# Patient Record
Sex: MALE | Race: BLACK OR AFRICAN AMERICAN | NOT HISPANIC OR LATINO | Employment: FULL TIME | URBAN - METROPOLITAN AREA
[De-identification: names, ages, dates, MRNs, and addresses within clinical notes are randomized per-mention and may not be internally consistent; named-entity substitution may affect disease eponyms.]

---

## 2022-02-21 ENCOUNTER — LAB REQUISITION (OUTPATIENT)
Dept: LAB | Facility: CLINIC | Age: 22
End: 2022-02-21
Payer: COMMERCIAL

## 2022-02-21 DIAGNOSIS — H18.621 KERATOCONUS, UNSTABLE, RIGHT EYE: ICD-10-CM

## 2022-02-21 PROCEDURE — 88304 TISSUE EXAM BY PATHOLOGIST: CPT | Mod: TC,ORL | Performed by: OPHTHALMOLOGY

## 2022-02-21 PROCEDURE — 88304 TISSUE EXAM BY PATHOLOGIST: CPT | Mod: 26 | Performed by: OPHTHALMOLOGY

## 2022-02-21 PROCEDURE — 88313 SPECIAL STAINS GROUP 2: CPT | Mod: 26 | Performed by: OPHTHALMOLOGY

## 2022-02-21 PROCEDURE — 88313 SPECIAL STAINS GROUP 2: CPT | Mod: TC,ORL | Performed by: OPHTHALMOLOGY

## 2022-02-22 LAB
PATH REPORT.COMMENTS IMP SPEC: NORMAL
PATH REPORT.COMMENTS IMP SPEC: NORMAL
PATH REPORT.FINAL DX SPEC: NORMAL
PATH REPORT.GROSS SPEC: NORMAL
PATH REPORT.MICROSCOPIC SPEC OTHER STN: NORMAL
PATH REPORT.RELEVANT HX SPEC: NORMAL
PHOTO IMAGE: NORMAL

## 2022-11-14 ENCOUNTER — HOSPITAL ENCOUNTER (EMERGENCY)
Facility: CLINIC | Age: 22
Discharge: HOME | End: 2022-11-14
Attending: EMERGENCY MEDICINE | Admitting: EMERGENCY MEDICINE
Payer: COMMERCIAL

## 2022-11-14 VITALS
SYSTOLIC BLOOD PRESSURE: 138 MMHG | OXYGEN SATURATION: 96 % | DIASTOLIC BLOOD PRESSURE: 96 MMHG | HEART RATE: 109 BPM | TEMPERATURE: 97.6 F | RESPIRATION RATE: 14 BRPM

## 2022-11-14 DIAGNOSIS — T40.711A ACCIDENTAL CANNABIS OVERDOSE, INITIAL ENCOUNTER: ICD-10-CM

## 2022-11-14 PROCEDURE — 93005 ELECTROCARDIOGRAM TRACING: CPT

## 2022-11-14 PROCEDURE — 99283 EMERGENCY DEPT VISIT LOW MDM: CPT

## 2022-11-14 ASSESSMENT — ACTIVITIES OF DAILY LIVING (ADL)
ADLS_ACUITY_SCORE: 35
ADLS_ACUITY_SCORE: 35

## 2022-11-14 NOTE — ED TRIAGE NOTES
Pt presents via EMS for concerns of an unintentional ingestion of delta8 and cbd. Pt states he was taking a break and consumed a brown his grandmother had made him. Pt states he was unaware of what was in the brown. States increasing anxiety. EMS reports heart rates as high at 170. Poison control advised cardiac monitoring for reports of tachycardia. Pt arrives hyperventilating. Cooperative but anxious. ABC intact, A&Ox4.     Triage Assessment     Row Name 11/14/22 1233       Triage Assessment (Adult)    Airway WDL WDL       Respiratory WDL    Respiratory WDL WDL       Skin Circulation/Temperature WDL    Skin Circulation/Temperature WDL WDL       Cardiac WDL    Cardiac WDL rhythm    Cardiac Rhythm ST       Peripheral/Neurovascular WDL    Peripheral Neurovascular WDL WDL       Cognitive/Neuro/Behavioral WDL    Cognitive/Neuro/Behavioral WDL mood/behavior    Mood/Behavior anxious

## 2022-11-14 NOTE — ED PROVIDER NOTES
History   Chief Complaint:  Palpitations       The history is limited by the condition of the patient (Intoxication).      Grace Sanchez is a 21 year old male who presents with palpitations. Approximately 1 hour ago, he accidentally consumed some brownies that contained CBD in them. Afterwards, he felt like his skin was crawling and noticed some palpitations. He called EMS and was brought to the ED. At bedside, he denies doing this on purpose. Also, he denies drug use or alcohol use.     Review of Systems   Unable to perform ROS: Mental status change (Intoxication)     Allergies:  No Known Allergies    Medications:  The patient denies current use of medications.     Past Medical History:     The patient denies pertinent past medical history.    Past Surgical History:    The patient denies pertinent past surgical history.     Family History:    The patient denies pertinent family history.      Social History:  The patient presents to the ED alone via private vehicle   PCP: No primary care provider on file.   Denies hx of alcohol use or drug use.     Physical Exam     Patient Vitals for the past 24 hrs:   BP Temp Pulse Resp SpO2   11/14/22 1555 -- -- 104 26 97 %   11/14/22 1545 -- -- 100 16 98 %   11/14/22 1535 (!) 146/82 -- 97 16 96 %   11/14/22 1430 (!) 151/79 -- (!) 122 25 100 %   11/14/22 1400 (!) 148/74 -- (!) 121 19 99 %   11/14/22 1345 (!) 148/81 -- (!) 126 13 92 %   11/14/22 1315 (!) 171/90 -- (!) 156 (!) 33 99 %   11/14/22 1233 (!) 167/95 97.6  F (36.4  C) (!) 165 24 98 %       Physical Exam  Constitutional: Patient is well appearing. Anxious and breathing fast.   Head: Atraumatic.  Eyes: Conjunctivae and EOM are normal. No scleral icterus.  Pupils normal.  Neck: Normal range of motion. Neck supple.   Cardiovascular: Tachycardia, regular rhythm, normal heart sounds and intact distal perfusion.   Pulmonary/Chest: Breath sounds normal. No respiratory distress.  Abdominal: Soft. Bowel sounds are normal. No  distension. No tenderness. No rebound or guarding.   Musculoskeletal: Normal range of motion. No edema or tenderness.   Neurological: Alert and orientated to person, place, and time. No observable focal neuro deficit  Skin: Warm and dry. No rash noted. Not diaphoretic.     Emergency Department Course   ECG:  ECG taken at 1304, ECG read at 1304  Sinus tachycardia   Possible Left atrial enlargement   Nonspecific T wave abnormality   Abnormal ECG   Rate 148 bpm. MT interval 148 ms. QRS duration 78 ms. QT/QTc 244/383 ms. P-R-T axes 65 36 9.     Emergency Department Course:    Reviewed:  I reviewed nursing notes, vitals and past medical history    Assessments:  1235 I obtained history and examined the patient as noted above.    I rechecked the patient and explained findings. I discussed plan for discharge home.    Disposition:  Care of the patient was transferred to my colleague Dr. Moore pending clinical sobriety.     Impression & Plan   Medical Decision Making:  Grace Sanchez is a 21 year old male who presents for evaluation of cannabis ingestion. This is consistent with drug overdose.  This is an unintentional overdose.   A broad workup was considered but patient can clearly show that grandma made THC or Delta8 containing brownies and he felt ill effects after accidentally eating these and after prolonged observation he is now sx free and asking to go home.        Diagnosis:    ICD-10-CM    1. Accidental cannabis overdose, initial encounter  T40.711A           Discharge Medications:  New Prescriptions    No medications on file       Scribe Disclosure:  I, Ryan Meet, am serving as a scribe at 12:37 PM on 11/14/2022 to document services personally performed by Ashok Nagel MD based on my observations and the provider's statements to me.        Ashok Nagel MD  11/14/22 9816

## 2022-11-15 LAB
ATRIAL RATE - MUSE: 148 BPM
DIASTOLIC BLOOD PRESSURE - MUSE: NORMAL MMHG
INTERPRETATION ECG - MUSE: NORMAL
P AXIS - MUSE: 65 DEGREES
PR INTERVAL - MUSE: 148 MS
QRS DURATION - MUSE: 78 MS
QT - MUSE: 244 MS
QTC - MUSE: 383 MS
R AXIS - MUSE: 36 DEGREES
SYSTOLIC BLOOD PRESSURE - MUSE: NORMAL MMHG
T AXIS - MUSE: 9 DEGREES
VENTRICULAR RATE- MUSE: 148 BPM

## 2023-02-12 ENCOUNTER — HEALTH MAINTENANCE LETTER (OUTPATIENT)
Age: 23
End: 2023-02-12

## 2023-03-03 ENCOUNTER — OFFICE VISIT (OUTPATIENT)
Dept: INTERNAL MEDICINE | Facility: CLINIC | Age: 23
End: 2023-03-03
Payer: COMMERCIAL

## 2023-03-03 VITALS
RESPIRATION RATE: 16 BRPM | BODY MASS INDEX: 36.12 KG/M2 | DIASTOLIC BLOOD PRESSURE: 75 MMHG | OXYGEN SATURATION: 97 % | WEIGHT: 296.6 LBS | HEIGHT: 76 IN | TEMPERATURE: 98.1 F | HEART RATE: 98 BPM | SYSTOLIC BLOOD PRESSURE: 144 MMHG

## 2023-03-03 DIAGNOSIS — E55.9 VITAMIN D DEFICIENCY: ICD-10-CM

## 2023-03-03 DIAGNOSIS — R03.0 WHITE COAT SYNDROME WITHOUT DIAGNOSIS OF HYPERTENSION: ICD-10-CM

## 2023-03-03 DIAGNOSIS — F41.9 ANXIETY: ICD-10-CM

## 2023-03-03 DIAGNOSIS — Z11.4 SCREENING FOR HIV (HUMAN IMMUNODEFICIENCY VIRUS): ICD-10-CM

## 2023-03-03 DIAGNOSIS — Z00.00 ROUTINE GENERAL MEDICAL EXAMINATION AT A HEALTH CARE FACILITY: Primary | ICD-10-CM

## 2023-03-03 DIAGNOSIS — E66.01 MORBID OBESITY (H): ICD-10-CM

## 2023-03-03 DIAGNOSIS — E66.812 CLASS 2 OBESITY WITHOUT SERIOUS COMORBIDITY WITH BODY MASS INDEX (BMI) OF 36.0 TO 36.9 IN ADULT, UNSPECIFIED OBESITY TYPE: ICD-10-CM

## 2023-03-03 DIAGNOSIS — Z11.59 NEED FOR HEPATITIS C SCREENING TEST: ICD-10-CM

## 2023-03-03 DIAGNOSIS — E11.9 TYPE 2 DIABETES MELLITUS WITHOUT COMPLICATION, WITHOUT LONG-TERM CURRENT USE OF INSULIN (H): ICD-10-CM

## 2023-03-03 DIAGNOSIS — R00.2 PALPITATIONS: ICD-10-CM

## 2023-03-03 LAB
ALBUMIN SERPL BCG-MCNC: 4.3 G/DL (ref 3.5–5.2)
ALP SERPL-CCNC: 111 U/L (ref 40–129)
ALT SERPL W P-5'-P-CCNC: 19 U/L (ref 10–50)
ANION GAP SERPL CALCULATED.3IONS-SCNC: 12 MMOL/L (ref 7–15)
AST SERPL W P-5'-P-CCNC: 21 U/L (ref 10–50)
BILIRUB SERPL-MCNC: 0.2 MG/DL
BUN SERPL-MCNC: 15.8 MG/DL (ref 6–20)
CALCIUM SERPL-MCNC: 9.5 MG/DL (ref 8.6–10)
CHLORIDE SERPL-SCNC: 101 MMOL/L (ref 98–107)
CHOLEST SERPL-MCNC: 146 MG/DL
CREAT SERPL-MCNC: 1.03 MG/DL (ref 0.67–1.17)
DEPRECATED HCO3 PLAS-SCNC: 24 MMOL/L (ref 22–29)
ERYTHROCYTE [DISTWIDTH] IN BLOOD BY AUTOMATED COUNT: 11.4 % (ref 10–15)
GFR SERPL CREATININE-BSD FRML MDRD: >90 ML/MIN/1.73M2
GLUCOSE SERPL-MCNC: 370 MG/DL (ref 70–99)
HBA1C MFR BLD: 13 % (ref 0–5.6)
HCT VFR BLD AUTO: 44.8 % (ref 40–53)
HDLC SERPL-MCNC: 41 MG/DL
HGB BLD-MCNC: 15.3 G/DL (ref 13.3–17.7)
LDLC SERPL CALC-MCNC: 85 MG/DL
MCH RBC QN AUTO: 29.6 PG (ref 26.5–33)
MCHC RBC AUTO-ENTMCNC: 34.2 G/DL (ref 31.5–36.5)
MCV RBC AUTO: 87 FL (ref 78–100)
NONHDLC SERPL-MCNC: 105 MG/DL
PLATELET # BLD AUTO: 308 10E3/UL (ref 150–450)
POTASSIUM SERPL-SCNC: 4 MMOL/L (ref 3.4–5.3)
PROT SERPL-MCNC: 7.1 G/DL (ref 6.4–8.3)
RBC # BLD AUTO: 5.17 10E6/UL (ref 4.4–5.9)
SODIUM SERPL-SCNC: 137 MMOL/L (ref 136–145)
TRIGL SERPL-MCNC: 98 MG/DL
TSH SERPL DL<=0.005 MIU/L-ACNC: 1.4 UIU/ML (ref 0.3–4.2)
WBC # BLD AUTO: 10.1 10E3/UL (ref 4–11)

## 2023-03-03 PROCEDURE — 80061 LIPID PANEL: CPT

## 2023-03-03 PROCEDURE — 99385 PREV VISIT NEW AGE 18-39: CPT | Mod: 25

## 2023-03-03 PROCEDURE — 87389 HIV-1 AG W/HIV-1&-2 AB AG IA: CPT

## 2023-03-03 PROCEDURE — 82306 VITAMIN D 25 HYDROXY: CPT

## 2023-03-03 PROCEDURE — 85027 COMPLETE CBC AUTOMATED: CPT

## 2023-03-03 PROCEDURE — 36415 COLL VENOUS BLD VENIPUNCTURE: CPT

## 2023-03-03 PROCEDURE — 90715 TDAP VACCINE 7 YRS/> IM: CPT

## 2023-03-03 PROCEDURE — 80053 COMPREHEN METABOLIC PANEL: CPT

## 2023-03-03 PROCEDURE — 86803 HEPATITIS C AB TEST: CPT

## 2023-03-03 PROCEDURE — 84443 ASSAY THYROID STIM HORMONE: CPT

## 2023-03-03 PROCEDURE — 99214 OFFICE O/P EST MOD 30 MIN: CPT | Mod: 25

## 2023-03-03 PROCEDURE — 90471 IMMUNIZATION ADMIN: CPT

## 2023-03-03 PROCEDURE — 83036 HEMOGLOBIN GLYCOSYLATED A1C: CPT

## 2023-03-03 RX ORDER — HYDROXYZINE HYDROCHLORIDE 25 MG/1
25 TABLET, FILM COATED ORAL 3 TIMES DAILY PRN
Qty: 30 TABLET | Refills: 1 | Status: SHIPPED | OUTPATIENT
Start: 2023-03-03

## 2023-03-03 RX ORDER — GLUCOSAMINE HCL/CHONDROITIN SU 500-400 MG
CAPSULE ORAL
Qty: 100 EACH | Refills: 3 | Status: SHIPPED | OUTPATIENT
Start: 2023-03-03

## 2023-03-03 RX ORDER — PREDNISOLONE ACETATE 10 MG/ML
SUSPENSION/ DROPS OPHTHALMIC
COMMUNITY
Start: 2022-12-13

## 2023-03-03 RX ORDER — PROPRANOLOL HYDROCHLORIDE 10 MG/1
10 TABLET ORAL 3 TIMES DAILY PRN
Qty: 30 TABLET | Refills: 1 | Status: SHIPPED | OUTPATIENT
Start: 2023-03-03

## 2023-03-03 RX ORDER — LANCETS
EACH MISCELLANEOUS
Qty: 100 EACH | Refills: 6 | Status: SHIPPED | OUTPATIENT
Start: 2023-03-03

## 2023-03-03 ASSESSMENT — ANXIETY QUESTIONNAIRES
5. BEING SO RESTLESS THAT IT IS HARD TO SIT STILL: SEVERAL DAYS
1. FEELING NERVOUS, ANXIOUS, OR ON EDGE: SEVERAL DAYS
GAD7 TOTAL SCORE: 9
3. WORRYING TOO MUCH ABOUT DIFFERENT THINGS: SEVERAL DAYS
6. BECOMING EASILY ANNOYED OR IRRITABLE: SEVERAL DAYS
7. FEELING AFRAID AS IF SOMETHING AWFUL MIGHT HAPPEN: MORE THAN HALF THE DAYS
IF YOU CHECKED OFF ANY PROBLEMS ON THIS QUESTIONNAIRE, HOW DIFFICULT HAVE THESE PROBLEMS MADE IT FOR YOU TO DO YOUR WORK, TAKE CARE OF THINGS AT HOME, OR GET ALONG WITH OTHER PEOPLE: SOMEWHAT DIFFICULT
2. NOT BEING ABLE TO STOP OR CONTROL WORRYING: SEVERAL DAYS
GAD7 TOTAL SCORE: 9

## 2023-03-03 ASSESSMENT — ENCOUNTER SYMPTOMS
CONSTIPATION: 1
CHILLS: 1
SHORTNESS OF BREATH: 1
NERVOUS/ANXIOUS: 1
PALPITATIONS: 1

## 2023-03-03 ASSESSMENT — PATIENT HEALTH QUESTIONNAIRE - PHQ9
5. POOR APPETITE OR OVEREATING: MORE THAN HALF THE DAYS
SUM OF ALL RESPONSES TO PHQ QUESTIONS 1-9: 11

## 2023-03-03 ASSESSMENT — PAIN SCALES - GENERAL: PAINLEVEL: NO PAIN (0)

## 2023-03-03 NOTE — PATIENT INSTRUCTIONS
Propranolol for racing heart     Hydroxyzine can help for thoughts or general anxious feelings- makes drowsy    Exercise, journaling, deep breathing techniques. Reach out if you would like a referral to a therapist.     Low salt diet, losing weight, reduce caffeine     Noom and weight watchers     Watch BP outside of clinic if the 24 hour monitor does not work. I would like your BP to be under 140/90. Can schedule nurse visit in about a month as well.

## 2023-03-03 NOTE — PROGRESS NOTES
insulinSUBJECTIVE:   CC: Grace is an 22 year old who presents for preventative health visit.     Patient has been advised of split billing requirements and indicates understanding: Yes  Healthy Habits:     Getting at least 3 servings of Calcium per day:  Yes    Bi-annual eye exam:  Yes    Dental care twice a year:  NO    Sleep apnea or symptoms of sleep apnea:  None    Diet:  Breakfast skipped    Frequency of exercise:  1 day/week    Duration of exercise:  45-60 minutes    Taking medications regularly:  Yes    Medication side effects:  None    PHQ-2 Total Score: 0    Additional concerns today:  Yes      Today's PHQ-2 Score:   PHQ-2 ( 1999 [employer]) 3/3/2023   Q1: Little interest or pleasure in doing things 0   Q2: Feeling down, depressed or hopeless 0   PHQ-2 Score 0   Q1: Little interest or pleasure in doing things Several days   Q2: Feeling down, depressed or hopeless Several days   PHQ-2 Score 2       Social History     Tobacco Use     Smoking status: Never     Smokeless tobacco: Never   Substance Use Topics     Alcohol use: Not on file       Alcohol Use 3/3/2023   Prescreen: >3 drinks/day or >7 drinks/week? Not Applicable       Last PSA: No results found for: PSA    Reviewed orders with patient. Reviewed health maintenance and updated orders accordingly - Yes  Lab work is in process    Reviewed and updated as needed this visit by clinical staff   Tobacco  Allergies  Meds              Reviewed and updated as needed this visit by Provider                 History reviewed. No pertinent past medical history.   Past Surgical History:   Procedure Laterality Date     CORNEAL TRANSPLANT         Review of Systems   Constitutional: Positive for chills.   Respiratory: Positive for shortness of breath.    Cardiovascular: Positive for palpitations.   Gastrointestinal: Positive for constipation.   Genitourinary: Negative for impotence and penile discharge.   Psychiatric/Behavioral: Positive for mood changes. The patient is  "nervous/anxious.      Back in November ate synthetic marijuana and since them you have had racing heart and stomach cramping intermittently- it is less often than it used to be but is still happening once every 2-3 weeks. Symptoms last   Denies dizziness, SOB with these occurrences  Sometimes has feeling of fear or impending doom      OBJECTIVE:   BP (!) 154/84   Pulse 115   Temp 98.1  F (36.7  C) (Tympanic)   Resp 16   Ht 1.93 m (6' 4\")   Wt 134.5 kg (296 lb 9.6 oz)   SpO2 97%   BMI 36.10 kg/m      Physical Exam  GENERAL: alert and no distress  EYES: Eyes grossly normal to inspection, PERRL and conjunctivae and sclerae normal  HENT: ear canals and TM's normal, nose and mouth without ulcers or lesions  NECK: no adenopathy, no asymmetry, masses, or scars and thyroid normal to palpation  RESP: lungs clear to auscultation - no rales, rhonchi or wheezes  CV: regular rate and rhythm, normal S1 S2, no S3 or S4, no murmur, click or rub, no peripheral edema and peripheral pulses strong  ABDOMEN: soft, nontender, no hepatosplenomegaly, no masses and bowel sounds normal  MS: no gross musculoskeletal defects noted, no edema  SKIN: no suspicious lesions or rashes  NEURO: Normal strength and tone, mentation intact and speech normal  PSYCH: mentation appears normal, affect normal/bright    Diagnostic Test Results:  Labs reviewed in Epic    ASSESSMENT/PLAN:   (Z00.00) Routine general medical examination at a health care facility  (primary encounter diagnosis)  Comment: Routine physical  Plan: Hemoglobin A1c, Comprehensive metabolic panel         (BMP + Alb, Alk Phos, ALT, AST, Total. Bili,         TP), TSH with free T4 reflex, CBC with         platelets, Lipid panel reflex to direct LDL         Fasting            (Z11.4) Screening for HIV (human immunodeficiency virus)  Comment: Screening  Plan: HIV Antigen Antibody Combo            (Z11.59) Need for hepatitis C screening test  Comment: Screening  Plan: Hepatitis C Screen " Reflex to HCV RNA Quant and         Genotype            (R00.2) Palpitations  Comment: Patient has intermittent palpitations that resolve spontaneously.  There are no associated shortness of breath or dizziness.  Will check Holter to make sure this is not significant arrhythmia.  Plan: propranolol (INDERAL) 10 MG tablet, TSH with         free T4 reflex, Adult Leadless EKG Monitor 8 to        14 Days            (E66.9,  Z68.36) Class 2 obesity without serious comorbidity with body mass index (BMI) of 36.0 to 36.9 in adult, unspecified obesity type  Comment: Noted, patient does have diabetes    (F41.9) Anxiety  Comment: Patient describes what sounds like anxiety attacks since consuming synthetic marijuana brownie accidentally.  I will prescribe propranolol for physical symptoms of palpitations, and hydroxyzine for general anxious feelings/racing thoughts.  Both of these medications are to be used as needed  Plan: propranolol (INDERAL) 10 MG tablet, hydrOXYzine        (ATARAX) 25 MG tablet, Vitamin D Deficiency            (R03.0) White coat syndrome without diagnosis of hypertension  Comment: Patient BP slightly elevated today in clinic.  We will asked him to return in about 1 month for nurse visit  Plan: 24 Hour Blood Pressure Monitor - Adult            (E11.9) Type 2 diabetes mellitus without complication, without long-term current use of insulin (H)  Comment: Patient A1c returned resulted at 13.  He has no previous lab records which would indicate diabetes meaning that this is his initial diagnosis.  We will check islet antibodies to assess type I versus type 2 diabetes.    Plan to start long-acting insulin at 12 units.  Patient should check blood sugar daily in the morning before eating.  If greater than 150 for 3 days in a row he should increase medication by 2 units.    MTM and diabetic education referral placed    Plan: Islet cell antibody IgG, Islet Antigen (IA-2)         Autoantibody, Serum, blood glucose  monitoring         (NO BRAND SPECIFIED) meter device kit, blood         glucose (NO BRAND SPECIFIED) test strip, blood         glucose calibration (NO BRAND SPECIFIED)         solution, thin (NO BRAND SPECIFIED) lancets,         alcohol swab prep pads, Med Therapy Management         Referral, AMB Adult Diabetes Educator Referral,        insulin glargine (LANTUS PEN) 100 UNIT/ML pen,         insulin pen needle (32G X 4 MM) 32G X 4 MM         miscellaneous            (E66.01) Morbid obesity (H)  Comment: with diabetes.  Discussed with patient techniques to lose weight to help improve blood pressure.  This will also help with his new diagnosis of diabetes    He reports that he has never smoked. He has never used smokeless tobacco.      Juan Diego Ernandez NP  [facility]

## 2023-03-06 ENCOUNTER — OFFICE VISIT (OUTPATIENT)
Dept: PHARMACY | Facility: CLINIC | Age: 23
End: 2023-03-06
Payer: COMMERCIAL

## 2023-03-06 DIAGNOSIS — E11.69 TYPE 2 DIABETES MELLITUS WITH OTHER SPECIFIED COMPLICATION, WITHOUT LONG-TERM CURRENT USE OF INSULIN (H): Primary | ICD-10-CM

## 2023-03-06 LAB
DEPRECATED CALCIDIOL+CALCIFEROL SERPL-MC: 11 UG/L (ref 20–75)
HCV AB SERPL QL IA: NONREACTIVE
HIV 1+2 AB+HIV1 P24 AG SERPL QL IA: NONREACTIVE

## 2023-03-06 PROCEDURE — 99207 PR NO CHARGE LOS: CPT | Performed by: PHARMACIST

## 2023-03-06 RX ORDER — BLOOD SUGAR DIAGNOSTIC
STRIP MISCELLANEOUS
Qty: 100 STRIP | Refills: 11 | Status: SHIPPED | OUTPATIENT
Start: 2023-03-06

## 2023-03-06 RX ORDER — LANCETS
EACH MISCELLANEOUS
Qty: 100 EACH | Refills: 11 | Status: SHIPPED | OUTPATIENT
Start: 2023-03-06

## 2023-03-06 NOTE — PROGRESS NOTES
Clinical Pharmacy Consult:                                                    Grace Sanchez is a 22 year old male coming in for a clinical pharmacist consult.  He was referred to me from Juan Diego Camargo.     Reason for Consult: new diabetes diagnosis- Insulin start, meter education    Discussion:  Reviewed Accu-Chek Guide meter - he was able to use during visit.  Fasting blood glucose as 317 today.  Reviewed Lantus Solostar - he was able to perform a demo administration today.  Reviewed dosing technique, storage and hypoglycemia signs/treatment.      Plan:  1. Start Lantus 12 units once daily per Juan Diego.  2. Check blood sugars once daily before eating, goal blood sugar  mg/dl.  Patient should check blood sugar daily in the morning before eating.  If greater than 150 for 3 days in a row he should increase medication by 2 units.  3. Diabetes education should be calling you to schedule a visit.  If you don't hear from them by Wednesday, call them at [phone].  4.  Testing supplies and insulin are at the [facility] in [place]  - [facility] 1st floor      Francine Danielle , Pharm D  [phone] (phone)  Medication Therapy Management Pharmacist

## 2023-03-08 ENCOUNTER — VIRTUAL VISIT (OUTPATIENT)
Dept: EDUCATION SERVICES | Facility: CLINIC | Age: 23
End: 2023-03-08
Payer: COMMERCIAL

## 2023-03-08 DIAGNOSIS — E11.9 TYPE 2 DIABETES MELLITUS WITHOUT COMPLICATION, WITHOUT LONG-TERM CURRENT USE OF INSULIN (H): ICD-10-CM

## 2023-03-08 PROCEDURE — G0108 DIAB MANAGE TRN  PER INDIV: HCPCS | Mod: 93 | Performed by: DIETITIAN, REGISTERED

## 2023-03-08 PROCEDURE — 99207 PR NO CHARGE LOS: CPT | Mod: 93 | Performed by: DIETITIAN, REGISTERED

## 2023-03-08 NOTE — PATIENT INSTRUCTIONS
1).  Increase lantus insulin to 16 units nightly and increase 2 units every 2-3 days until fasting (morning) blood sugar consistently under 150    2).  45-75 grams of carb per meal; 15-30 grams per snack    3).  Make lab appointment    4).  Test blood sugar fasting and before and 2 hours after start of meal at (one meal) per day

## 2023-03-08 NOTE — CONFIDENTIAL NOTE
Diabetes Self-Management Education & Support    Presents for: Initial Assessment for new diagnosis    Type of Service: Telephone Visit    Originating Location (Patient Location): Home  Distant Location (Provider Location): Home  Mode of Communication:  Telephone        How would patient like to obtain AVS? MyChart    Assessment Type:   ASSESSMENT:  Pt newly dx with DM.  R/o type 1 vs 2.  Started new dx education.  Titrate insulin.    Patient's most recent   Lab Results   Component Value Date    A1C 13.0 03/03/2023     is not meeting goal of <7.0    Diabetes knowledge and skills assessment:   Patient is knowledgeable in diabetes management concepts related to: Taking Medication    Continue education with the following diabetes management concepts: Healthy Eating, Being Active and Monitoring    Based on learning assessment above, most appropriate setting for further diabetes education would be: Individual setting.      PLAN    Increase insulin 0.05 units per kg Lantus  12 - > 16 units  Add Cpeptide and BG lab    Increase testing     Topics to cover at upcoming visits: Healthy Eating, Being Active, Monitoring and Taking Medication    Follow-up: April    See Care Plan for co-developed, patient-state behavior change goals.  AVS provided for patient today.    Education Materials Provided:  [employer] Healthy Living with Diabetes Book, Safe Disposal Options for Needles & Syringes, BG Log Sheet, Carbohydrate Counting and My Plate Planner      SUBJECTIVE/OBJECTIVE:  Presents for: Initial Assessment for new diagnosis  Accompanied by: Self  Diabetes education in the past 24mo: No  Focus of Visit: Monitoring, Healthy Eating, Taking Medication  Disease course: Improving  How confident are you filling out medical forms by yourself:: Not Assessed  Difficulty affording diabetes medication?: No  Difficulty affording diabetes testing supplies?: No  Other concerns:: None  Cultural Influences/Ethnic Background:  Not  or  "      Diabetes Symptoms & Complications:  Fatigue: No  Neuropathy: No  Polydipsia: No  Polyphagia: No  Polyuria: No  Visual change: No  Slow healing wounds: No  Weight trend: Decreasing (has lost about 95# in the last year with active jobs)  Complications assessed today?: No    Patient Problem List and Family Medical History reviewed for relevant medical history, current medical status, and diabetes risk factors.    Vitals:  There were no vitals taken for this visit.  Estimated body mass index is 36.1 kg/m  as calculated from the following:    Height as of 3/3/23: 1.93 m (6' 4\").    Weight as of 3/3/23: 134.5 kg (296 lb 9.6 oz).   Last 3 BP:   BP Readings from Last 3 Encounters:   03/03/23 (!) 144/75   11/14/22 (!) 138/96       History   Smoking Status     Never   Smokeless Tobacco     Never       Labs:  Lab Results   Component Value Date    A1C 13.0 03/03/2023     Lab Results   Component Value Date     03/03/2023     Lab Results   Component Value Date    LDL 85 03/03/2023     Direct Measure HDL   Date Value Ref Range Status   03/03/2023 41 >=40 mg/dL Final   ]  GFR Estimate   Date Value Ref Range Status   03/03/2023 >90 >60 mL/min/1.73m2 Final     Comment:     eGFR calculated using 2021 CKD-EPI equation.     No results found for: GFRESTBLACK  Lab Results   Component Value Date    CR 1.03 03/03/2023     No results found for: MICROALBUMIN    Healthy Eating:  Healthy Eating Assessed Today: Yes  Cultural/Caodaism diet restrictions?: No  Meals include: Lunch  Breakfast: Says breakfast hard to eat; oatmeal or avocado toast and eggs  Lunch: Before dx chips, juice, 1 sandwich; salmon and salad and raspberries  Dinner: grilled chicken and salad  Beverages: Juice, Water, Other (unsweetened almond milk)  Has patient met with a dietitian in the past?: No    Being Active:  Being Active Assessed Today: No  Exercise:: Currently not exercising (but moves around a lot at work)    Monitoring:  Monitoring Assessed " Today: Yes  Did patient bring glucose meter to appointment? : Yes  Times checking blood sugar at home (number): 1  Times checking blood sugar at home (per): Day  Blood glucose trend: Decreasing    This morning blood sugar 231; 233; 300    Taking Medications:  Diabetes Medication(s)     Insulin       insulin glargine (LANTUS PEN) 100 UNIT/ML pen    Begin taking 12 units at bedtime.  Take blood sugar in the morning before eating and if greater than 150 for 3 days in a row increase insulin by 2 units.  Repeat every until blood sugar is less than 150 in the morning before eating. Max dose 50 units          Taking Medication Assessed Today: Yes  Current Treatments: Diet, Insulin Injections  Dose schedule: At bedtime  Given by: Patient  Injection/Infusion sites: Abdomen  Problems taking diabetes medications regularly?: No    Problem Solving:  Problem Solving Assessed Today: Yes  Is the patient at risk for hypoglycemia?: Yes  Hypoglycemia Frequency: Never              Reducing Risks:  Reducing Risks Assessed Today: No    Healthy Coping:  Healthy Coping Assessed Today: Yes  Emotional response to diabetes: Ready to learn, Acceptance  Stage of change: ACTION (Actively working towards change)  Patient Activation Measure Survey Score:  No flowsheet data found.      Care Plan and Education Provided:  Care Plan: Diabetes   Updates made by Anjelica Beatty RD since 3/8/2023 12:00 AM      Problem: HbA1C Not In Goal       Goal: Establish Regular Follow-Ups with PCP       Task: Discuss with PCP the recommended timing for patient's next follow up visit(s)    Responsible User: Anjelica Beatty RD      Task: Discuss schedule for PCP visits with patient    Responsible User: Anjelica Beatty RD      Goal: Get HbA1C Level in Goal       Task: Educate patient on diabetes education self-management topics    Responsible User: Anjelica Beatty RD      Task: Educate patient on benefits of regular glucose monitoring    Responsible User:  Anjelica Beatty RD      Task: Refer patient to appropriate extended care team member, as needed (Medication Therapy Management, Behavioral Health, Physical Therapy, etc.)    Responsible User: Anjelica Beatty RD      Task: Discuss diabetes treatment plan with patient    Responsible User: Anjelica Beatty RD      Problem: Diabetes Self-Management Education Needed to Optimize Self-Care Behaviors       Goal: Understand diabetes pathophysiology and disease progression       Task: Provide education on diabetes pathophysiology and disease progression specfic to patient's diabetes type    Responsible User: Anjelica Beatty RD      Goal: Healthy Eating - follow a healthy eating pattern for diabetes       Task: Provide education on portion control and consistency in amount, composition and timing of food intake    Responsible User: Anjelica Beatty RD      Task: Provide education on managing carbohydrate intake (carbohydrate counting, plate planning method, etc.)    Responsible User: Anjelica Beatty RD      Task: Provide education on weight management    Responsible User: Anjelica Beatty RD      Task: Provide education on heart healthy eating    Responsible User: Anjelica Beatty RD      Task: Provide education on eating out    Responsible User: Anjelica Beatty RD      Task: Develop individualized healthy eating plan with patient    Responsible User: Anjelica Beatty RD      Goal: Being Active - get regular physical activity, working up to at least 150 minutes per week       Task: Provide education on relationship of activity to glucose and precautions to take if at risk for low glucose    Responsible User: Anjelica Beatty RD      Task: Discuss barriers to physical activity with patient    Responsible User: Anjelica Beatty RD      Task: Develop physical activity plan with patient    Responsible User: Anjelica Beatty RD      Task: Explore community resources including walking groups,  assistance programs, and home videos    Responsible User: Anjelica Beatty RD      Goal: Monitoring - monitor glucose and ketones as directed       Task: Provide education on blood glucose monitoring (purpose, proper technique, frequency, glucose targets, interpreting results, when to use glucose control solution, sharps disposal)    Responsible User: Anjelica Beatty RD      Task: Provide education on continuous glucose monitoring (sensor placement, use of darren or /reader, understanding glucose trends, alerts and alarms, differences between sensor glucose and blood glucose)    Responsible User: Anjelica Beatty RD      Task: Provide education on ketone monitoring (when to monitor, frequency, etc.)    Responsible User: Anjelica Beatty RD      Goal: Taking Medication - patient is consistently taking medications as directed       Task: Provide education on action of prescribed medication, including when to take and possible side effects    Responsible User: Anjelica Beatty RD      Task: Provide education on insulin and injectable diabetes medications, including administration, storage, site selection and rotation for injection sites    Responsible User: Anjelica Beatty RD      Task: Discuss barriers to medication adherence with patient and provide management technique ideas as appropriate    Responsible User: Anjelica Beatty RD      Task: Provide education on frequency and refill details of medications    Responsible User: Anjelica Beatty RD      Goal: Problem Solving - know how to prevent and manage short-term diabetes complications       Task: Provide education on high blood glucose - causes, signs/symptoms, prevention and treatment    Responsible User: Anjelica Beatty RD      Task: Provide education on low blood glucose - causes, signs/symptoms, prevention, treatment, carrying a carbohydrate source at all times, and medical identification    Responsible User: Anjelica Beatty RD       Task: Provide education on safe travel with diabetes    Responsible User: Anjelica Beatty RD      Task: Provide education on how to care for diabetes on sick days    Responsible User: Anjelica Beatty RD      Task: Provide education on when to call a health care provider    Responsible User: Anjelica Beatty RD      Goal: Reducing Risks - know how to prevent and treat long-term diabetes complications       Task: Provide education on major complications of diabetes, prevention, early diagnostic measures and treatment of complications    Responsible User: Anjelica Beatty RD      Task: Provide education on recommended care for dental, eye and foot health    Responsible User: Anjelica Beatty RD      Task: Provide education on Hemoglobin A1c - goals and relationship to blood glucose levels    Responsible User: Anjelica Beatty RD      Task: Provide education on recommendations for heart health - lipid levels and goals, blood pressure and goals, and aspirin therapy, if indicated    Responsible User: Anjelica Beatty RD      Task: Provide education on tobacco cessation    Responsible User: Anjelica Beatty RD      Goal: Healthy Coping - use available resources to cope with the challenges of managing diabetes       Task: Discuss recognizing feelings about having diabetes    Responsible User: Anjelica Beatty RD      Task: Provide education on the benefits of making appropriate lifestyle changes    Responsible User: Anjelica Beatty RD      Task: Provide education on benefits of utilizing support systems    Responsible User: Anjelica Beatty RD      Task: Discuss methods for coping with stress    Responsible User: Anjelica Beatty RD      Task: Provide education on when to seek professional counseling    Responsible User: Anjelica Beatty RD      Care Plan: Diabetes   Updates made by Anjelica Beatty RD since 3/8/2023 12:00 AM      Problem: HbA1C Not In Goal       Goal: Establish Regular  Follow-Ups with PCP       Task: Discuss with PCP the recommended timing for patient's next follow up visit(s)    Responsible User: Anjelica Beatty RD      Task: Discuss schedule for PCP visits with patient    Responsible User: Anjelica Beatty RD      Goal: Get HbA1C Level in Goal       Task: Educate patient on diabetes education self-management topics    Responsible User: Anjelica Beatty RD      Task: Educate patient on benefits of regular glucose monitoring    Responsible User: Anjelica Beatty RD      Task: Refer patient to appropriate extended care team member, as needed (Medication Therapy Management, Behavioral Health, Physical Therapy, etc.)    Responsible User: Anjelica Beatty RD      Task: Discuss diabetes treatment plan with patient    Responsible User: Anjelica Beatty RD      Problem: Diabetes Self-Management Education Needed to Optimize Self-Care Behaviors       Goal: Understand diabetes pathophysiology and disease progression       Task: Provide education on diabetes pathophysiology and disease progression specfic to patient's diabetes type    Responsible User: Anjelica Beatty RD      Goal: Healthy Eating - follow a healthy eating pattern for diabetes       Task: Provide education on portion control and consistency in amount, composition and timing of food intake Completed 3/8/2023   Responsible User: Anjelica Beatty RD      Task: Provide education on managing carbohydrate intake (carbohydrate counting, plate planning method, etc.) Completed 3/8/2023   Responsible User: Anjelica Beatty RD      Task: Provide education on weight management    Responsible User: Anjelica Beatty RD      Task: Provide education on heart healthy eating    Responsible User: Anjelica Beatty RD      Task: Provide education on eating out    Responsible User: Anjelica Beatty RD      Task: Develop individualized healthy eating plan with patient    Responsible User: Anjelica Beatty RD      Goal:  Being Active - get regular physical activity, working up to at least 150 minutes per week       Task: Provide education on relationship of activity to glucose and precautions to take if at risk for low glucose    Responsible User: Anjelica Beatty RD      Task: Discuss barriers to physical activity with patient    Responsible User: Anjelica Beatty RD      Task: Develop physical activity plan with patient    Responsible User: Anjelica Beatty RD      Task: Explore community resources including walking groups, assistance programs, and home videos    Responsible User: Anjelica Beatty RD      Goal: Monitoring - monitor glucose and ketones as directed    Start Date: 3/8/2023   This Visit's Progress: 30%   Note:    Check blood sugars 3 times per day     Task: Provide education on blood glucose monitoring (purpose, proper technique, frequency, glucose targets, interpreting results, when to use glucose control solution, sharps disposal) Completed 3/8/2023   Responsible User: Anjelica Beatty RD      Task: Provide education on continuous glucose monitoring (sensor placement, use of darren or /reader, understanding glucose trends, alerts and alarms, differences between sensor glucose and blood glucose)    Responsible User: Anjelica Beatty RD      Task: Provide education on ketone monitoring (when to monitor, frequency, etc.)    Responsible User: Anjelica Beatty RD      Goal: Taking Medication - patient is consistently taking medications as directed       Task: Provide education on action of prescribed medication, including when to take and possible side effects Completed 3/8/2023   Responsible User: Anjelica Beatty RD      Task: Provide education on insulin and injectable diabetes medications, including administration, storage, site selection and rotation for injection sites Completed 3/8/2023   Responsible User: Anjelica Beatty RD      Task: Discuss barriers to medication adherence with patient  and provide management technique ideas as appropriate    Responsible User: Anjelica Beatty RD      Task: Provide education on frequency and refill details of medications    Responsible User: Anjelica Beatty RD      Goal: Problem Solving - know how to prevent and manage short-term diabetes complications       Task: Provide education on high blood glucose - causes, signs/symptoms, prevention and treatment    Responsible User: Anjelica Beatty RD      Task: Provide education on low blood glucose - causes, signs/symptoms, prevention, treatment, carrying a carbohydrate source at all times, and medical identification    Responsible User: Anjelica Beatty RD      Task: Provide education on safe travel with diabetes    Responsible User: Anjelica Beatty RD      Task: Provide education on how to care for diabetes on sick days    Responsible User: Anjelica Beatty RD      Task: Provide education on when to call a health care provider    Responsible User: Anjelica Beatty RD      Goal: Reducing Risks - know how to prevent and treat long-term diabetes complications       Task: Provide education on major complications of diabetes, prevention, early diagnostic measures and treatment of complications    Responsible User: Anjelica Beatty RD      Task: Provide education on recommended care for dental, eye and foot health    Responsible User: Anjelica Beatty RD      Task: Provide education on Hemoglobin A1c - goals and relationship to blood glucose levels    Responsible User: Anjelica Beatty RD      Task: Provide education on recommendations for heart health - lipid levels and goals, blood pressure and goals, and aspirin therapy, if indicated    Responsible User: Anjelica Beatty RD      Task: Provide education on tobacco cessation    Responsible User: Anjelica Beatty RD      Goal: Healthy Coping - use available resources to cope with the challenges of managing diabetes       Task: Discuss recognizing  feelings about having diabetes    Responsible User: Anjelica Beatty RD      Task: Provide education on the benefits of making appropriate lifestyle changes    Responsible User: Anjelica Beatty RD      Task: Provide education on benefits of utilizing support systems    Responsible User: Anjelica Beatty RD      Task: Discuss methods for coping with stress    Responsible User: Anjelica Beatty RD      Task: Provide education on when to seek professional counseling    Responsible User: Anjelica Beatty RD              Time Spent: 33 minutes  Encounter Type: Individual    Any diabetes medication dose changes were made via the CDE Protocol per the patient's referring provider. A copy of this encounter was shared with the provider.      Anjelica Beatty RD [facility]  [phone]

## 2023-03-08 NOTE — LETTER
3/8/2023         RE: Grace Sanchez  [address]  [place] [address]        Dear Colleague,    Thank you for referring your patient, Grace Sanchez, to the [facility]. Please see a copy of my visit note below.    No notes on file

## 2023-03-12 ENCOUNTER — NURSE TRIAGE (OUTPATIENT)
Dept: NURSING | Facility: CLINIC | Age: 23
End: 2023-03-12
Payer: COMMERCIAL

## 2023-03-12 NOTE — TELEPHONE ENCOUNTER
Pt calling for Care Advice related to [place] Savings time and if he is to take his Insulin tonight at the same time or adjust for the lost one hour.  Care Advice given.  Kala King RN  [facility] Nurse Advisor    Reason for Disposition    Caller has medicine question only, adult not sick, AND triager answers question    Additional Information    Negative: Drug overdose and triager unable to answer question    Negative: Caller requesting a renewal or refill of a medicine patient is currently taking    Negative: Caller requesting information unrelated to medicine    Negative: Caller requesting information about COVID-19 Vaccine    Negative: Caller requesting information about Emergency Contraception    Negative: Caller requesting information about Combined Birth Control Pills    Negative: Caller requesting information about Progestin Birth Control Pills    Negative: Caller requesting information about Post-Op pain or medicines    Negative: Caller requesting a prescription antibiotic (such as Penicillin) for Strep throat and has a positive culture result    Negative: Caller requesting a prescription anti-viral med (such as Tamiflu) and has influenza (flu) symptoms    Negative: Immunization reaction suspected    Negative: Rash while taking a medicine or within 3 days of stopping it    Negative: [1] Asthma and [2] having symptoms of asthma (cough, wheezing, etc.)    Negative: [1] Symptom of illness (e.g., headache, abdominal pain, earache, vomiting) AND [2] more than mild    Negative: Breastfeeding questions about mother's medicines and diet    Negative: MORE THAN A DOUBLE DOSE of a prescription or over-the-counter (OTC) drug    Negative: [1] DOUBLE DOSE (an extra dose or lesser amount) of prescription drug AND [2] any symptoms (e.g., dizziness, nausea, pain, sleepiness)    Negative: [1] DOUBLE DOSE (an extra dose or lesser amount) of over-the-counter (OTC) drug AND [2] any symptoms (e.g., dizziness, nausea, pain,  sleepiness)    Negative: Took another person's prescription drug    Negative: [1] DOUBLE DOSE (an extra dose or lesser amount) of prescription drug AND [2] NO symptoms (Exception: a double dose of antibiotics)    Negative: Diabetes drug error or overdose (e.g., took wrong type of insulin or took extra dose)    Negative: [1] Prescription not at pharmacy AND [2] was prescribed by PCP recently (Exception: triager has access to EMR and prescription is recorded there. Go to Home Care and confirm for pharmacy.)    Negative: [1] Pharmacy calling with prescription question AND [2] triager unable to answer question    Negative: [1] Caller has URGENT medicine question about med that PCP or specialist prescribed AND [2] triager unable to answer question    Negative: Medicine patch causing local rash or itching    Negative: [1] Caller has medicine question about med NOT prescribed by PCP AND [2] triager unable to answer question (e.g., compatibility with other med, storage)    Negative: Prescription request for new medicine (not a refill)    Negative: [1] Caller has NON-URGENT medicine question about med that PCP prescribed AND [2] triager unable to answer question    Negative: Caller wants to use a complementary or alternative medicine    Negative: [1] Prescription prescribed recently is not at pharmacy AND [2] triager has access to patient's EMR AND [3] prescription is recorded in the EMR    Negative: [1] DOUBLE DOSE (an extra dose or lesser amount) of over-the-counter (OTC) drug AND [2] NO symptoms    Negative: [1] DOUBLE DOSE (an extra dose or lesser amount) of antibiotic drug AND [2] NO symptoms    Protocols used: MEDICATION QUESTION CALL-A-

## 2023-03-16 ENCOUNTER — LAB (OUTPATIENT)
Dept: LAB | Facility: CLINIC | Age: 23
End: 2023-03-16
Payer: COMMERCIAL

## 2023-03-16 DIAGNOSIS — E55.9 VITAMIN D DEFICIENCY: ICD-10-CM

## 2023-03-16 DIAGNOSIS — E11.9 TYPE 2 DIABETES MELLITUS WITHOUT COMPLICATION, WITHOUT LONG-TERM CURRENT USE OF INSULIN (H): ICD-10-CM

## 2023-03-16 LAB — GLUCOSE BLD-MCNC: 170 MG/DL (ref 60–99)

## 2023-03-16 PROCEDURE — 82947 ASSAY GLUCOSE BLOOD QUANT: CPT

## 2023-03-16 PROCEDURE — 84681 ASSAY OF C-PEPTIDE: CPT

## 2023-03-16 PROCEDURE — 36415 COLL VENOUS BLD VENIPUNCTURE: CPT

## 2023-03-16 PROCEDURE — 99000 SPECIMEN HANDLING OFFICE-LAB: CPT

## 2023-03-16 PROCEDURE — 86341 ISLET CELL ANTIBODY: CPT | Mod: 90

## 2023-03-17 LAB — C PEPTIDE SERPL-MCNC: 2.4 NG/ML (ref 0.9–6.9)

## 2023-03-18 LAB — PANC ISLET CELL AB TITR SER: NORMAL {TITER}

## 2023-03-21 ENCOUNTER — HOSPITAL ENCOUNTER (OUTPATIENT)
Dept: CARDIOLOGY | Facility: CLINIC | Age: 23
Discharge: HOME | End: 2023-03-21
Payer: COMMERCIAL

## 2023-03-21 DIAGNOSIS — R00.2 PALPITATIONS: ICD-10-CM

## 2023-03-21 LAB — ISLET CELL512 AB SER IA-ACNC: <5.4 U/ML

## 2023-03-21 PROCEDURE — 93248 EXT ECG>7D<15D REV&INTERPJ: CPT | Performed by: INTERNAL MEDICINE

## 2023-03-21 PROCEDURE — 93246 EXT ECG>7D<15D RECORDING: CPT

## 2023-03-22 ENCOUNTER — MYC MEDICAL ADVICE (OUTPATIENT)
Dept: INTERNAL MEDICINE | Facility: CLINIC | Age: 23
End: 2023-03-22
Payer: COMMERCIAL

## 2023-03-22 NOTE — TELEPHONE ENCOUNTER
Met with pt and dtr at bedside to discuss hospice services in the home setting. Explained how services work and how the discharge process will occur.  DME and meds will be ordered tonight for delivery tomorrow after 11am per daughter request. Will order Med Please see [employer] message and advise.

## 2023-03-24 NOTE — TELEPHONE ENCOUNTER
Take what exactly? The only medication that could be a concern is propranolol can mask signs of hypoglycemia like tachycardia.     Is there another medicaiton patient is wondering about?

## 2023-03-27 NOTE — TELEPHONE ENCOUNTER
[employer] message sent to patient/caregiver with provider recommendations.     Lara Membreno RN  [facility]

## 2023-03-29 ENCOUNTER — MYC REFILL (OUTPATIENT)
Dept: INTERNAL MEDICINE | Facility: CLINIC | Age: 23
End: 2023-03-29
Payer: COMMERCIAL

## 2023-03-29 DIAGNOSIS — E55.9 VITAMIN D DEFICIENCY: ICD-10-CM

## 2023-03-30 NOTE — TELEPHONE ENCOUNTER
Pending Prescriptions:                       Disp   Refills    vitamin D3 (CHOLECALCIFEROL) 1.25 MG (500*8 caps*0            Sig: Take 1 capsule (50,000 Units) by mouth every 7           days    Patient sent a [employer] message asking for refill - there is a patient comment that says he needs 4 more.    [employer] message sent to patient asking why he needs 4 more tabs (prescription sent 3/8/23 #8 tabs - take one tab every 7 days - prescription should be good until May 2023).

## 2023-03-30 NOTE — TELEPHONE ENCOUNTER
Called pharmacy and they stated that the insurance only allows him to get 4 at a time.  They split the prescription in half and has a refill.  They will get refill ready for the patient.      [employer] message sent to patient.

## 2023-04-05 ENCOUNTER — TELEPHONE (OUTPATIENT)
Dept: EDUCATION SERVICES | Facility: CLINIC | Age: 23
End: 2023-04-05

## 2023-04-05 ENCOUNTER — VIRTUAL VISIT (OUTPATIENT)
Dept: EDUCATION SERVICES | Facility: CLINIC | Age: 23
End: 2023-04-05
Payer: COMMERCIAL

## 2023-04-05 DIAGNOSIS — E11.9 TYPE 2 DIABETES MELLITUS WITHOUT COMPLICATION, WITHOUT LONG-TERM CURRENT USE OF INSULIN (H): Primary | ICD-10-CM

## 2023-04-05 PROCEDURE — G0108 DIAB MANAGE TRN  PER INDIV: HCPCS | Mod: 93 | Performed by: DIETITIAN, REGISTERED

## 2023-04-05 NOTE — TELEPHONE ENCOUNTER
Taqueria Adamson,    Pt's blood sugars are looking great on 22 units of lantus daily.          Can we start to add on metformin and titrate insulin?  I would recommend the followin).  Start with 500 mg daily of metformin XR and decrease insulin 25%    2). I will have him mychart in 1-2 weeks after doing this and if tolerating metformin XR, suggesting going up to 2 per day and another 25% decrease in his insulin    3)  At our next telephone visit in 1 month, if doing well on 2, I will titrate up to 3 and decrease his insulin another 25%    4).  I will have him mychart after this and titrate up to 4 if still doing well and look to discontinue insulin at this point.        If you're in agreement with plan, please indicate.  Orders pended for metformin and first titration of insulin.    Thanks!    WILLIAMS Ramsay [facility]  [phone]

## 2023-04-05 NOTE — LETTER
4/5/2023         RE: Grace Sanchez  [address]  [place] [address]        Dear Colleague,    Thank you for referring your patient, Grace Sanchez, to the [facility]. Please see a copy of my visit note below.    No notes on file

## 2023-04-05 NOTE — CONFIDENTIAL NOTE
22 units of insulin    Date Breakfast  Lunch  Dinner  Bedtime    Before After Before After Before After    4/5 119         4/4 113     101    4/3 111     135    4/2 117         4/1 130     167    3/31 94     119    3/30 125     120      Diabetes Self-Management Education & Support    Presents for: Follow-up    Type of Service: Telephone Visit    Originating Location (Patient Location): Home  Distant Location (Provider Location): [facility]  Mode of Communication:  Telephone    How would patient like to obtain AVS? Jatinderhart    Assessment Type:   ASSESSMENT:  It has been determined pt has type 2 vs type 1 DM.  Blood sugars 100% in goal.  Will ask about adding metformin and starting to wean insulin.  Pt may need additional medication beyond metformin, but he is not interested in GLP-1 or SGLT2 right now.  Losing weight on his own with diet and exercise changes.    Patient's most recent   Lab Results   Component Value Date    A1C 13.0 03/03/2023     is not meeting goal of <7.0    Diabetes knowledge and skills assessment:   Patient is knowledgeable in diabetes management concepts related to: Monitoring    Continue education with the following diabetes management concepts: Healthy Eating, Being Active and Taking Medication    Based on learning assessment above, most appropriate setting for further diabetes education would be: Individual setting.      PLAN    My chart for blood sugar review and 1 month for additional insulin titration and DM education  Topics to cover at upcoming visits: Healthy Eating, Being Active, Monitoring and Taking Medication    Follow-up: blood sugar review/education    See Care Plan for co-developed, patient-state behavior change goals.  AVS provided for patient today.    Education Materials Provided:  No new materials provided today      SUBJECTIVE/OBJECTIVE:  Presents for: Follow-up  Accompanied by: Self  Diabetes education in the past 24mo: Yes  Focus of Visit:  "Monitoring, Healthy Eating, Taking Medication  Disease course: Improving  How confident are you filling out medical forms by yourself:: Not Assessed  Difficulty affording diabetes medication?: No  Difficulty affording diabetes testing supplies?: No  Other concerns:: None  Cultural Influences/Ethnic Background:  Not  or       Diabetes Symptoms & Complications:  Fatigue: No  Neuropathy: No  Polydipsia: No  Polyphagia: No  Polyuria: No  Visual change: No  Slow healing wounds: No  Weight trend: Decreasing (has lost about 95# in the last year with active jobs)  Complications assessed today?: No    Patient Problem List and Family Medical History reviewed for relevant medical history, current medical status, and diabetes risk factors.    Vitals:  There were no vitals taken for this visit.  Estimated body mass index is 36.1 kg/m  as calculated from the following:    Height as of 3/3/23: 1.93 m (6' 4\").    Weight as of 3/3/23: 134.5 kg (296 lb 9.6 oz).   Last 3 BP:   BP Readings from Last 3 Encounters:   03/03/23 (!) 144/75   11/14/22 (!) 138/96       History   Smoking Status     Never   Smokeless Tobacco     Never       Labs:  Lab Results   Component Value Date    A1C 13.0 03/03/2023     Lab Results   Component Value Date     03/03/2023     Lab Results   Component Value Date    LDL 85 03/03/2023     Direct Measure HDL   Date Value Ref Range Status   03/03/2023 41 >=40 mg/dL Final   ]  GFR Estimate   Date Value Ref Range Status   03/03/2023 >90 >60 mL/min/1.73m2 Final     Comment:     eGFR calculated using 2021 CKD-EPI equation.     No results found for: GFRESTBLACK  Lab Results   Component Value Date    CR 1.03 03/03/2023     No results found for: MICROALBUMIN    Healthy Eating:  Healthy Eating Assessed Today: Yes  Cultural/Yazdanism diet restrictions?: No  Meals include: Lunch, Breakfast, Dinner  Breakfast: Says breakfast hard to eat; oatmeal or avocado toast and eggs  Lunch: Before dx chips, juice, 1 " sandwich; salmon and salad and raspberries  Dinner: grilled chicken and salad  Beverages: Juice, Water, Other (unsweetened almond milk)  Has patient met with a dietitian in the past?: Yes    Being Active:  Being Active Assessed Today: Yes  Exercise::  ( and )    Monitoring:  Monitoring Assessed Today: Yes  Did patient bring glucose meter to appointment? : Yes  Times checking blood sugar at home (number): 2  Times checking blood sugar at home (per): Day  Blood glucose trend: Decreasing    See top of note    Taking Medications:  Diabetes Medication(s)     Biguanides       metFORMIN (GLUCOPHAGE XR) 500 MG 24 hr tablet    Take 1 tablet (500 mg) by mouth daily (with dinner)    Insulin       insulin glargine (LANTUS PEN) 100 UNIT/ML pen    Take 16 units at bedtime.          Taking Medication Assessed Today: Yes  Current Treatments: Diet, Insulin Injections  Dose schedule: At bedtime  Given by: Patient  Injection/Infusion sites: Abdomen  Problems taking diabetes medications regularly?: No    Problem Solving:  Problem Solving Assessed Today: Yes  Is the patient at risk for hypoglycemia?: Yes  Hypoglycemia Frequency: Never              Reducing Risks:  Reducing Risks Assessed Today: No    Healthy Coping:  Healthy Coping Assessed Today: Yes  Emotional response to diabetes: Ready to learn, Acceptance  Stage of change: ACTION (Actively working towards change)  Patient Activation Measure Survey Score:       View : No data to display.                  Care Plan and Education Provided:  Care Plan: Diabetes   Updates made by Anjelica Beatty RD since 4/6/2023 12:00 AM      Problem: HbA1C Not In Goal       Goal: Establish Regular Follow-Ups with PCP       Task: Discuss with PCP the recommended timing for patient's next follow up visit(s) Completed 4/6/2023   Responsible User: Anjelica Beatty RD      Task: Discuss schedule for PCP visits with patient Completed 4/6/2023   Responsible User: Anjelica Beatty  WILLIAMS      Problem: Diabetes Self-Management Education Needed to Optimize Self-Care Behaviors       Goal: Healthy Eating - follow a healthy eating pattern for diabetes       Task: Provide education on managing carbohydrate intake (carbohydrate counting, plate planning method, etc.) Completed 4/6/2023   Responsible User: Anjelica Beatty RD      Goal: Monitoring - monitor glucose and ketones as directed       Task: Provide education on blood glucose monitoring (purpose, proper technique, frequency, glucose targets, interpreting results, when to use glucose control solution, sharps disposal) Completed 4/6/2023   Responsible User: Anjelica Beatty RD      Goal: Taking Medication - patient is consistently taking medications as directed    Start Date: 4/6/2023   This Visit's Progress: 0%   Note:    Pt will add metformin tablet daily and wean down insulin     Task: Provide education on action of prescribed medication, including when to take and possible side effects Completed 4/6/2023   Responsible User: Anjelica Beatty RD      Task: Provide education on insulin and injectable diabetes medications, including administration, storage, site selection and rotation for injection sites Completed 4/6/2023   Responsible User: Anjelica Beatty RD              Time Spent: 30 minutes  Encounter Type: Individual    Any diabetes medication dose changes were made via the CDE Protocol per the patient's referring provider. A copy of this encounter was shared with the provider.

## 2023-04-06 ENCOUNTER — HOSPITAL ENCOUNTER (OUTPATIENT)
Dept: CARDIOLOGY | Facility: CLINIC | Age: 23
Discharge: HOME | End: 2023-04-06
Payer: COMMERCIAL

## 2023-04-06 DIAGNOSIS — R03.0 WHITE COAT SYNDROME WITHOUT DIAGNOSIS OF HYPERTENSION: ICD-10-CM

## 2023-04-06 PROCEDURE — 93786 AMBL BP MNTR W/SW REC ONLY: CPT

## 2023-04-06 RX ORDER — METFORMIN HCL 500 MG
500 TABLET, EXTENDED RELEASE 24 HR ORAL
Qty: 60 TABLET | Refills: 1 | Status: SHIPPED | OUTPATIENT
Start: 2023-04-06 | End: 2023-04-14

## 2023-04-06 NOTE — TELEPHONE ENCOUNTER
Taqueria Adamson,    He will be meeting with me again in about 1 month, but I will recommend he also make follow up with you in June I that's okay?  Will be three months since A1C.    Thanks    Anjelica Beatty RD [facility]  [phone]

## 2023-04-06 NOTE — TELEPHONE ENCOUNTER
I am in agreement with this plan. Will patient need to schedule appointment with me, or will he be meeting you to make sure that this is appropriate at managing blood sugars?    Juan Diego Ernandez NP

## 2023-04-14 ENCOUNTER — MYC MEDICAL ADVICE (OUTPATIENT)
Dept: EDUCATION SERVICES | Facility: CLINIC | Age: 23
End: 2023-04-14
Payer: COMMERCIAL

## 2023-04-14 DIAGNOSIS — E11.9 TYPE 2 DIABETES MELLITUS WITHOUT COMPLICATION, WITHOUT LONG-TERM CURRENT USE OF INSULIN (H): ICD-10-CM

## 2023-04-14 RX ORDER — METFORMIN HCL 500 MG
500 TABLET, EXTENDED RELEASE 24 HR ORAL 2 TIMES DAILY WITH MEALS
Qty: 60 TABLET | Refills: 1 | Status: SHIPPED | OUTPATIENT
Start: 2023-04-14 | End: 2023-04-21

## 2023-04-14 NOTE — TELEPHONE ENCOUNTER
Diabetes Education Follow-up    Subjective/Objective:    Grace STRATTON Laura sent in blood glucose log for review. Last date of communication was: 4/5/23.    Diabetes is being managed with   Lifestyle (diet/activity), Diabetes Medications   Diabetes Medication(s)     Biguanides       metFORMIN (GLUCOPHAGE XR) 500 MG 24 hr tablet    Take 1 tablet (500 mg) by mouth 2 times daily (with meals)    Insulin       insulin glargine (LANTUS PEN) 100 UNIT/ML pen    Take 12 units at bedtime.          BG/Food Log:         Assessment:    Pt tolerating 1 metformin XR per day.     Plan/Response:   Will plan to increase to two tablets per day and decrease insulin 25%     Lantus 0-0-0-16 - > 0-0-0-12        Any diabetes medication dose changes were made via the CDE Protocol and Collaborative Practice Agreement with the patient's referring provider. A copy of this encounter was shared with the provider.    WILLIAMS Ramsay [facility]  [phone]

## 2023-04-21 ENCOUNTER — MYC MEDICAL ADVICE (OUTPATIENT)
Dept: EDUCATION SERVICES | Facility: CLINIC | Age: 23
End: 2023-04-21
Payer: COMMERCIAL

## 2023-04-21 DIAGNOSIS — E11.9 TYPE 2 DIABETES MELLITUS WITHOUT COMPLICATION, WITHOUT LONG-TERM CURRENT USE OF INSULIN (H): ICD-10-CM

## 2023-04-21 RX ORDER — METFORMIN HCL 500 MG
TABLET, EXTENDED RELEASE 24 HR ORAL
Qty: 90 TABLET | Refills: 1 | Status: SHIPPED | OUTPATIENT
Start: 2023-04-21 | End: 2023-05-05

## 2023-05-05 ENCOUNTER — VIRTUAL VISIT (OUTPATIENT)
Dept: EDUCATION SERVICES | Facility: OTHER | Age: 23
End: 2023-05-05
Payer: COMMERCIAL

## 2023-05-05 DIAGNOSIS — E11.9 TYPE 2 DIABETES MELLITUS WITHOUT COMPLICATION, WITHOUT LONG-TERM CURRENT USE OF INSULIN (H): ICD-10-CM

## 2023-05-05 PROCEDURE — G0108 DIAB MANAGE TRN  PER INDIV: HCPCS | Mod: AE | Performed by: DIETITIAN, REGISTERED

## 2023-05-05 RX ORDER — METFORMIN HCL 500 MG
1000 TABLET, EXTENDED RELEASE 24 HR ORAL 2 TIMES DAILY WITH MEALS
Qty: 360 TABLET | Refills: 1 | Status: SHIPPED | OUTPATIENT
Start: 2023-05-05 | End: 2023-07-31

## 2023-05-05 NOTE — CONFIDENTIAL NOTE
Diabetes Self-Management Education & Support    Presents for: Follow-up    Type of Service: Telephone Visit    Originating Location (Patient Location): Home  Distant Location (Provider Location): Offsite  Mode of Communication:  Telephone    Telephone Visit Start Time: 9 am   Telephone Visit End Time (telephone visit stop time): 9:30 am    How would patient like to obtain AVS? MyChart    Assessment Type:   ASSESSMENT:  Pt doing well on three metformin xr per day and 9 units of insulin.  Insulin dose currently calculated at 0.06 units per kg.    Patient's most recent   Lab Results   Component Value Date    A1C 13.0 03/03/2023     is not meeting goal of <7.0    Diabetes knowledge and skills assessment:   Patient is knowledgeable in diabetes management concepts related to: Healthy Eating and Monitoring    Continue education with the following diabetes management concepts: Healthy Eating, Monitoring and Taking Medication    Based on learning assessment above, most appropriate setting for further diabetes education would be: Individual setting.      PLAN    Discontinue insulin as approved by PCP  Increase metformin to 4 per day    Education complete  Pt to make follow up with PCP as A1C due in June.  Give us update next week on mychart to how blood sugars are doing off insulin  Topics to cover at upcoming visits: annual education    Pt may want CGM in future if has compatible phone  Pt declines GLP-1 at this time    Follow-up: as desired    See Care Plan for co-developed, patient-state behavior change goals.  AVS provided for patient today.    Education Materials Provided:  No new materials provided today      SUBJECTIVE/OBJECTIVE:  Presents for: Follow-up  Accompanied by: Self  Diabetes education in the past 24mo: Yes  Focus of Visit: Monitoring, Healthy Eating, Taking Medication, Diabetes Pathophysiology, Reducing Risks  Diabetes type: Type 2  Date of diagnosis: 2022  Disease course: Improving  How confident are you  "filling out medical forms by yourself:: Not Assessed  Difficulty affording diabetes medication?: No  Difficulty affording diabetes testing supplies?: No  Other concerns:: None  Cultural Influences/Ethnic Background:  Not  or       Diabetes Symptoms & Complications:  Fatigue: No  Neuropathy: No  Polydipsia: No  Polyphagia: No  Polyuria: No  Visual change: No  Slow healing wounds: No  Weight trend: Decreasing (has lost about 95# in the last year with active jobs)  Complications assessed today?: No    Patient Problem List and Family Medical History reviewed for relevant medical history, current medical status, and diabetes risk factors.    Vitals:  There were no vitals taken for this visit.  Estimated body mass index is 36.1 kg/m  as calculated from the following:    Height as of 3/3/23: 1.93 m (6' 4\").    Weight as of 3/3/23: 134.5 kg (296 lb 9.6 oz).   Last 3 BP:   BP Readings from Last 3 Encounters:   03/03/23 (!) 144/75   11/14/22 (!) 138/96       History   Smoking Status     Never   Smokeless Tobacco     Never       Labs:  Lab Results   Component Value Date    A1C 13.0 03/03/2023     Lab Results   Component Value Date     03/03/2023     Lab Results   Component Value Date    LDL 85 03/03/2023     Direct Measure HDL   Date Value Ref Range Status   03/03/2023 41 >=40 mg/dL Final   ]  GFR Estimate   Date Value Ref Range Status   03/03/2023 >90 >60 mL/min/1.73m2 Final     Comment:     eGFR calculated using 2021 CKD-EPI equation.     No results found for: GFRESTBLACK  Lab Results   Component Value Date    CR 1.03 03/03/2023     No results found for: MICROALBUMIN    Healthy Eating:  Healthy Eating Assessed Today: Yes  Cultural/Cheondoism diet restrictions?: No  Meals include: Lunch, Breakfast, Dinner  Breakfast: Says breakfast hard to eat; oatmeal or avocado toast and eggs  Lunch: Before dx chips, juice, 1 sandwich; salmon and salad and raspberries  Dinner: grilled chicken and salad  Snacks: walnuts " fruit sugar free popsicle  Beverages: Water, Other, Diet soda (unsweetened almond milk)  Has patient met with a dietitian in the past?: Yes    Being Active:  Being Active Assessed Today: Yes  Exercise:: Yes (going walking early in the morning)    Monitoring:  Monitoring Assessed Today: Yes  Did patient bring glucose meter to appointment? : Yes  Times checking blood sugar at home (number): 2  Times checking blood sugar at home (per): Day  Blood glucose trend: Decreasing (3% wt loss)    Says morning blood sugars 94-98; 110-140 (2 hours after evening meals).      Taking Medications:  Diabetes Medication(s)     Biguanides       metFORMIN (GLUCOPHAGE XR) 500 MG 24 hr tablet    Take 2 tablets (1,000 mg) by mouth 2 times daily (with meals)          Taking Medication Assessed Today: Yes  Current Treatments: Diet, Insulin Injections  Dose schedule: At bedtime  Given by: Patient  Injection/Infusion sites: Abdomen  Problems taking diabetes medications regularly?: No    Problem Solving:  Problem Solving Assessed Today: Yes  Is the patient at risk for hypoglycemia?: Yes  Hypoglycemia Frequency: Never              Reducing Risks:  Reducing Risks Assessed Today: Yes  CAD Risks: Diabetes Mellitus, Male sex  Has dilated eye exam at least once a year?: Yes  Sees dentist every 6 months?: Yes    Healthy Coping:  Healthy Coping Assessed Today: Yes  Emotional response to diabetes: Ready to learn, Acceptance  Stage of change: ACTION (Actively working towards change)  Patient Activation Measure Survey Score:       View : No data to display.                  Care Plan and Education Provided:  Care Plan: Diabetes   Updates made by Anjelica Beatty RD since 5/5/2023 12:00 AM      Problem: HbA1C Not In Goal       Goal: Get HbA1C Level in Goal       Task: Educate patient on diabetes education self-management topics Completed 5/5/2023   Responsible User: Anjelica Beatty RD      Task: Educate patient on benefits of regular glucose monitoring  Completed 5/5/2023   Responsible User: Anjelica Beatty RD      Problem: Diabetes Self-Management Education Needed to Optimize Self-Care Behaviors       Goal: Understand diabetes pathophysiology and disease progression       Task: Provide education on diabetes pathophysiology and disease progression specfic to patient's diabetes type Completed 5/5/2023   Responsible User: Anjelica Beatty RD      Goal: Healthy Eating - follow a healthy eating pattern for diabetes       Task: Provide education on portion control and consistency in amount, composition and timing of food intake Completed 5/5/2023   Responsible User: Anjelica Beatty RD      Task: Provide education on weight management Completed 5/5/2023   Responsible User: Anjelica Beatty RD      Task: Provide education on heart healthy eating Completed 5/5/2023   Responsible User: Anjelica Beatty RD      Goal: Being Active - get regular physical activity, working up to at least 150 minutes per week       Task: Provide education on relationship of activity to glucose and precautions to take if at risk for low glucose Completed 5/5/2023   Responsible User: Anjelica Beatty RD      Goal: Taking Medication - patient is consistently taking medications as directed    Start Date: 4/6/2023   This Visit's Progress: 100%   Recent Progress: 0%   Note:    Pt will add metformin tablet daily and wean down insulin     Goal: Problem Solving - know how to prevent and manage short-term diabetes complications       Task: Provide education on high blood glucose - causes, signs/symptoms, prevention and treatment Completed 5/5/2023   Responsible User: Anjelica Beatty RD      Goal: Reducing Risks - know how to prevent and treat long-term diabetes complications       Task: Provide education on major complications of diabetes, prevention, early diagnostic measures and treatment of complications Completed 5/5/2023   Responsible User: Anjelica Beatty RD      Task: Provide  education on recommended care for dental, eye and foot health Completed 5/5/2023   Responsible User: Anjelica Beatty RD      Task: Provide education on Hemoglobin A1c - goals and relationship to blood glucose levels Completed 5/5/2023   Responsible User: Anjelica Beatty RD      Task: Provide education on recommendations for heart health - lipid levels and goals, blood pressure and goals, and aspirin therapy, if indicated Completed 5/5/2023   Responsible User: Anjelica Beatty RD              Time Spent: 30 minutes  Encounter Type: Individual    Any diabetes medication dose changes were made via the CDE Protocol per the patient's referring provider. A copy of this encounter was shared with the provider.    Anjelica Beatty RD [facility]  [phone]

## 2023-05-05 NOTE — PATIENT INSTRUCTIONS
"Tomorrow, start to take 2 metformin tablets with breakfast and 2 metformin tablets with dinner    Stop you insulin when you start with 4 metformin per day.    Please send us a mychart with your blood sugars in another week -or sooner if you see blood sugars going up off insulin.    Please make Diabetes follow up appointment with your clinic for June    Let us know at any time if you want us to order either the dexcom G7 or Javier 3 for you.      I forgot to mention the Dexcom G7 does have a \"\" device you can see your blood sugars on without your phone - so you could go this route if you want.  This one you don't need to scan every 8 hours, but you do need to carry around a separate device other than your phone to see your blood sugars.    Just let us know if you want that option : )    Thanks!    Anjelica Beatty RD [facility]  [phone]   "

## 2023-05-05 NOTE — LETTER
5/5/2023         RE: Grace Sanchez  [address]  [place] [address]        Dear Colleague,    Thank you for referring your patient, Grace Sanchez, to the [facility]. Please see a copy of my visit note below.    No notes on file

## 2023-06-13 ENCOUNTER — LAB (OUTPATIENT)
Dept: LAB | Facility: CLINIC | Age: 23
End: 2023-06-13
Payer: COMMERCIAL

## 2023-06-13 DIAGNOSIS — E55.9 VITAMIN D DEFICIENCY: ICD-10-CM

## 2023-06-13 DIAGNOSIS — E11.9 DIABETES MELLITUS, TYPE 2 (H): ICD-10-CM

## 2023-06-13 LAB — HBA1C MFR BLD: 5.2 % (ref 0–5.6)

## 2023-06-13 PROCEDURE — 82306 VITAMIN D 25 HYDROXY: CPT

## 2023-06-13 PROCEDURE — 82043 UR ALBUMIN QUANTITATIVE: CPT

## 2023-06-13 PROCEDURE — 36415 COLL VENOUS BLD VENIPUNCTURE: CPT

## 2023-06-13 PROCEDURE — 82570 ASSAY OF URINE CREATININE: CPT

## 2023-06-13 PROCEDURE — 83036 HEMOGLOBIN GLYCOSYLATED A1C: CPT

## 2023-06-14 ENCOUNTER — MYC MEDICAL ADVICE (OUTPATIENT)
Dept: INTERNAL MEDICINE | Facility: CLINIC | Age: 23
End: 2023-06-14
Payer: COMMERCIAL

## 2023-06-14 LAB
CREAT UR-MCNC: 277 MG/DL
DEPRECATED CALCIDIOL+CALCIFEROL SERPL-MC: 35 UG/L (ref 20–75)
MICROALBUMIN UR-MCNC: <12 MG/L
MICROALBUMIN/CREAT UR: NORMAL MG/G{CREAT}

## 2023-07-31 ENCOUNTER — VIRTUAL VISIT (OUTPATIENT)
Dept: INTERNAL MEDICINE | Facility: CLINIC | Age: 23
End: 2023-07-31
Payer: COMMERCIAL

## 2023-07-31 DIAGNOSIS — E11.9 TYPE 2 DIABETES MELLITUS WITHOUT COMPLICATION, WITHOUT LONG-TERM CURRENT USE OF INSULIN (H): Primary | ICD-10-CM

## 2023-07-31 PROCEDURE — 99213 OFFICE O/P EST LOW 20 MIN: CPT | Mod: 95

## 2023-07-31 RX ORDER — METFORMIN HCL 500 MG
1000 TABLET, EXTENDED RELEASE 24 HR ORAL EVERY MORNING
Qty: 180 TABLET | Refills: 0 | Status: SHIPPED | OUTPATIENT
Start: 2023-07-31 | End: 2023-11-22

## 2023-07-31 NOTE — PROGRESS NOTES
"Grace is a 22 year old who is being evaluated via a billable video visit.      How would you like to obtain your AVS? MyChart  If the video visit is dropped, the invitation should be resent by: Text to cell phone: [phone]  Will anyone else be joining your video visit? No          Assessment & Plan       Type 2 diabetes mellitus without complication, without long-term current use of insulin (H)  Patient has had good glycemic control with an A1c of 5.2 at his last check. He is wondering if he needs to continue metformin 2000mg per day. We will try to reduce to 1000mg per day and see where his A1c is at mid September.     He was originally on lantus, but transitioned off after by May. His fasting blood sugars have been below 100 in the morning so I think this is a reasonable plan. If A1c is elevated in September we will increase metformin back to 2000 mg   - Basic metabolic panel  (Ca, Cl, CO2, Creat, Gluc, K, Na, BUN); Future  - Hemoglobin A1c; Future  - metFORMIN (GLUCOPHAGE XR) 500 MG 24 hr tablet; Take 2 tablets (1,000 mg) by mouth every morning     BMI:   Estimated body mass index is 36.1 kg/m  as calculated from the following:    Height as of 3/3/23: 1.93 m (6' 4\").    Weight as of 3/3/23: 134.5 kg (296 lb 9.6 oz).       Juan Diego Ernandez NP  [facility]    Jeanie Edwards is a 22 year old, presenting for the following health issues:  Diabetes (Follow up diabetes and hopefully to lower metformin dosage.)      7/31/2023     1:39 PM   Additional Questions   Roomed by Tammi Rayo MA   Accompanied by Himself       HPI     Diabetes Follow-up    How often are you checking your blood sugar? Two times daily  Blood sugar testing frequency justification:  Frequent hypoglycemia and Uncontrolled diabetes  What time of day are you checking your blood sugars (select all that apply)?  Before and after meals  Have you had any blood sugars above 200?  No  Have you had any blood sugars below 70?  " No  What symptoms do you notice when your blood sugar is low?  None  What concerns do you have today about your diabetes? None   Do you have any of these symptoms? (Select all that apply)  No numbness or tingling in feet.  No redness, sores or blisters on feet.  No complaints of excessive thirst.  No reports of blurry vision.  No significant changes to weight.  Have you had a diabetic eye exam in the last 12 months? Yes- Date of last eye exam: Couple of weeks ago        BP Readings from Last 2 Encounters:   03/03/23 (!) 144/75   11/14/22 (!) 138/96     Hemoglobin A1C (%)   Date Value   06/13/2023 5.2   03/03/2023 13.0 (H)     LDL Cholesterol Calculated (mg/dL)   Date Value   03/03/2023 85       How many servings of fruits and vegetables do you eat daily?  2-3  On average, how many sweetened beverages do you drink each day (Examples: soda, juice, sweet tea, etc.  Do NOT count diet or artificially sweetened beverages)?   0  How many days per week do you exercise enough to make your heart beat faster? 5  How many minutes a day do you exercise enough to make your heart beat faster? 60 or more  How many days per week do you miss taking your medication? 0      Fasting blood sugar is between 88 and 93 in the morning    Currently taking 2 metformin in the morning and two in the evening     Review of Systems   Constitutional, HEENT, cardiovascular, pulmonary, gi and gu systems are negative, except as otherwise noted.      Objective    Vitals - Patient Reported  Weight (Patient Reported): 116.1 kg (256 lb)        Physical Exam   GENERAL: alert and no distress  EYES: Eyes grossly normal to inspection.  No discharge or erythema, or obvious scleral/conjunctival abnormalities.  RESP: No audible wheeze, cough, or visible cyanosis.  No visible retractions or increased work of breathing.    SKIN: Visible skin clear. No significant rash, abnormal pigmentation or lesions.  NEURO: Cranial nerves grossly intact.  Mentation and speech  appropriate for age.  PSYCH: Mentation appears normal, affect normal/bright, judgement and insight intact, normal speech and appearance well-groomed.          Video-Visit Details    Type of service:  Video Visit   Video Start Time:  3:26  Video End Time:3:34 PM    Originating Location (pt. Location): Home  Distant Location (provider location):  On-site  Platform used for Video Visit: [place]

## 2023-08-28 ENCOUNTER — HOSPITAL ENCOUNTER (EMERGENCY)
Facility: CLINIC | Age: 23
Discharge: HOME | End: 2023-08-28
Attending: PHYSICIAN ASSISTANT | Admitting: PHYSICIAN ASSISTANT
Payer: OTHER MISCELLANEOUS

## 2023-08-28 VITALS
RESPIRATION RATE: 20 BRPM | WEIGHT: 262 LBS | OXYGEN SATURATION: 99 % | BODY MASS INDEX: 32.58 KG/M2 | TEMPERATURE: 97.3 F | HEART RATE: 127 BPM | DIASTOLIC BLOOD PRESSURE: 96 MMHG | HEIGHT: 75 IN | SYSTOLIC BLOOD PRESSURE: 148 MMHG

## 2023-08-28 DIAGNOSIS — S61.011A LACERATION OF RIGHT THUMB WITHOUT FOREIGN BODY WITHOUT DAMAGE TO NAIL, INITIAL ENCOUNTER: ICD-10-CM

## 2023-08-28 PROCEDURE — 250N000011 HC RX IP 250 OP 636: Performed by: PHYSICIAN ASSISTANT

## 2023-08-28 PROCEDURE — 99284 EMERGENCY DEPT VISIT MOD MDM: CPT

## 2023-08-28 PROCEDURE — 12001 RPR S/N/AX/GEN/TRNK 2.5CM/<: CPT

## 2023-08-28 RX ORDER — BUPIVACAINE HYDROCHLORIDE 5 MG/ML
10 INJECTION, SOLUTION EPIDURAL; INTRACAUDAL ONCE
Status: COMPLETED | OUTPATIENT
Start: 2023-08-28 | End: 2023-08-28

## 2023-08-28 RX ORDER — CEPHALEXIN 500 MG/1
500 CAPSULE ORAL 4 TIMES DAILY
Qty: 20 CAPSULE | Refills: 0 | Status: SHIPPED | OUTPATIENT
Start: 2023-08-28 | End: 2023-09-02

## 2023-08-28 RX ADMIN — BUPIVACAINE HYDROCHLORIDE 50 MG: 5 INJECTION, SOLUTION EPIDURAL; INTRACAUDAL; PERINEURAL at 12:22

## 2023-08-28 ASSESSMENT — ACTIVITIES OF DAILY LIVING (ADL): ADLS_ACUITY_SCORE: 33

## 2023-08-28 NOTE — ED PROVIDER NOTES
"  History   Chief Complaint:  Laceration    HPI   Grace Sanchez is a 22 year old male up to date on tetanus vaccination (2023) with history of type 2 diabetes mellitus presenting to the emergency department for evaluation of a laceration to the right first finger. Grace explains that he was cleaning the trash shoot at work when he accidentally cut his right thumb on a broken glass bottle. He denies any concerns for retained glass in the wound. Patient has good range of motion of thumb. No other injuries.     Independent Historian:   None - Patient Only    Medications:    Hydroxyzine   Metformin   Propranolol     Past Medical History:    Type 2 diabetes mellitus     Past Surgical History:    Corneal transplant     Physical Exam   Patient Vitals for the past 24 hrs:   BP Temp Temp src Pulse Resp SpO2 Height Weight   08/28/23 1003 (!) 148/96 97.3  F (36.3  C) Temporal (!) 127 20 99 % 1.905 m (6' 3\") 118.8 kg (262 lb)     Physical Exam  Constitutional: Alert, attentive, GCS 15  HENT:    Nose: Nose normal.    Mouth/Throat: Oropharynx is clear, mucous membranes are moist   Eyes: EOM are normal.   CV: regular rate and rhythm; no murmurs, rubs or gallups  Chest: Effort normal and breath sounds normal.   MSK: Normal range of motion of right thumb including flexion and extension of IP and MCP joint.   Neurological: Alert, attentive  Skin: Skin is warm and dry. Linear horizontal laceration to right thumb without active bleeding. No foreign body.    Emergency Department Course     Procedures     Laceration Repair    Procedure: Laceration Repair  Indication: Laceration  Consent: Verbal  Location:  R first (thumb) finger  Length: 2 cm  Preparation: Irrigation with Sterile Saline.  Anesthesia/Sedation: Bupivacaine - 0.5%   Treatment/Exploration: Wound explored, no foreign bodies found   Closure: The wound was closed with one layer. Skin/superficial layer was closed with 4 x 4-0 Nylon using Interrupted sutures.   Patient Status: " The patient tolerated the procedure well: Yes. There were no complications.    Emergency Department Course & Assessments:    Interventions:  Medications   BUPivacaine (MARCAINE) 0.5% preservative free injection (50 mg Intradermal $Given by Other Clinician 8/28/23 1222)     Assessments:  1051 I obtained history and examined the patient as noted above.    1147 I rechecked the patient. Laceration repair as noted above. I discussed findings and discharge with the patient. All questions answered.     Independent Interpretation (X-rays, CTs, rhythm strip):  None    Consultations/Discussion of Management or Tests:  None     Social Determinants of Health affecting care:   None    Disposition:  The patient was discharged to home.     Impression & Plan      Medical Decision Making:  Grace Sanchez is a 22 year old male up to date on tetanus vaccination (2023) with history of type 2 diabetes mellitus who presents with a laceration to right thumb. The wound was carefully evaluated and explored. The laceration was closed with sutures as noted above. There is no evidence of muscular, tendon, or bony damage with this laceration. No signs of foreign body. Xrays offered but declined by patient stating he is not concerned for possible retained glass. Possible complications (infection, scarring) were reviewed with the patient. Tetanus up to date. Follow up with primary care will be indicated for suture removal as noted in the discharge section. Antibiotic prescribed given history of DMII.      Diagnosis:    ICD-10-CM    1. Laceration of right thumb without foreign body without damage to nail, initial encounter  S61.011A         Discharge Medications:  Discharge Medication List as of 8/28/2023 12:21 PM        START taking these medications    Details   cephALEXin (KEFLEX) 500 MG capsule Take 1 capsule (500 mg) by mouth 4 times daily for 5 days, Disp-20 capsule, R-0, E-Prescribe            Scribe Disclosure:  Tressa PERSAUD am  serving as a scribe at 10:52 AM on 8/28/2023 to document services personally performed by Jocelyn Grover PA-C based on my observations and the provider's statements to me.     8/28/2023   Jocelyn Grover PA-C Steinbrueck, Emily, PA-C  08/28/23 7710

## 2023-08-28 NOTE — ED TRIAGE NOTES
Pt cut right thumb while at work today. Cut on glass. 2 cm laceration to web of thumb.      Triage Assessment       Row Name 08/28/23 1004       Triage Assessment (Adult)    Airway WDL WDL       Respiratory WDL    Respiratory WDL WDL       Skin Circulation/Temperature WDL    Skin Circulation/Temperature WDL --  laceration to right thumb       Cardiac WDL    Cardiac WDL WDL       Peripheral/Neurovascular WDL    Peripheral Neurovascular WDL WDL       Cognitive/Neuro/Behavioral WDL    Cognitive/Neuro/Behavioral WDL WDL

## 2023-08-28 NOTE — Clinical Note
Grace's mother accompanied Grace Sanchez to the emergency department on 8/28/2023. They may return to work on 08/29/2023.      If you have any questions or concerns, please don't hesitate to call.      Jocelyn Grover PA-C

## 2023-08-28 NOTE — LETTER
August 28, 2023      To Whom It May Concern:      Grace Sanchez was seen in our Emergency Department today, 08/28/23.  I expect his condition to improve over the next 1-3 days.  He may return to work/school when improved.    Sincerely,        Angie Mantilla RN

## 2023-08-28 NOTE — Clinical Note
Grace Sanchez was seen and treated in our emergency department on 8/28/2023.  He may return to work on 08/29/2023.       If you have any questions or concerns, please don't hesitate to call.      Jocelyn Grover PA-C

## 2023-09-07 ENCOUNTER — ALLIED HEALTH/NURSE VISIT (OUTPATIENT)
Dept: NURSING | Facility: CLINIC | Age: 23
End: 2023-09-07
Payer: COMMERCIAL

## 2023-09-07 DIAGNOSIS — Z48.02 ENCOUNTER FOR REMOVAL OF SUTURES: Primary | ICD-10-CM

## 2023-09-07 PROCEDURE — 99024 POSTOP FOLLOW-UP VISIT: CPT | Performed by: INTERNAL MEDICINE

## 2023-09-07 NOTE — PROGRESS NOTES
Cordellmarissa STRATTON Laura presents to the clinic today for removal of sutures.  The patient has had the sutures in place for 10 days.  There has been no history of infection or drainage.  4 sutures are seen located on the right thumb.  The wound is healing well with no signs of infection.  Tetanus status is up to date.   All sutures were easily removed today.  Routine wound care discussed.  The patient will follow up as needed.     Placed three steri strips on wound for extra reinforcement.    Thank you,  Yayo Rivera, Triage RN [place]  3:20 PM 9/7/2023

## 2023-09-14 ENCOUNTER — VIRTUAL VISIT (OUTPATIENT)
Dept: INTERNAL MEDICINE | Facility: CLINIC | Age: 23
End: 2023-09-14
Payer: COMMERCIAL

## 2023-09-14 DIAGNOSIS — E11.9 TYPE 2 DIABETES MELLITUS WITHOUT COMPLICATION, WITHOUT LONG-TERM CURRENT USE OF INSULIN (H): Primary | ICD-10-CM

## 2023-09-14 PROCEDURE — 99213 OFFICE O/P EST LOW 20 MIN: CPT | Mod: 95 | Performed by: INTERNAL MEDICINE

## 2023-09-14 NOTE — PROGRESS NOTES
"Grace is a 22 year old who is being evaluated via a billable video visit.      How would you like to obtain your AVS? MyChart  If the video visit is dropped, the invitation should be resent by: Text to cell phone: [phone]  Will anyone else be joining your video visit? No          Assessment & Plan     Type 2 diabetes mellitus without complication, without long-term current use of insulin (H)  Overall, patient average fasting blood glucose levels, patient has had good glycemic control.  He currently is on 500 mg of metformin and anticipating getting off the medication completely.  To complete A1c.  If A1c is elevated, patient may need to continue with the medication.can consider up titration if appropriate pending lab results.  - Hemoglobin A1c; Future             BMI:   Estimated body mass index is 32.75 kg/m  as calculated from the following:    Height as of 8/28/23: 1.905 m (6' 3\").    Weight as of 8/28/23: 118.8 kg (262 lb).           Bhavya Yoon MD  [facility]    Subjective   Garce is a 22 year old, presenting for the following health issues:  Recheck Medication      HPI     Diabetes follow up. Currently on 500mg of metformin per day. Fasting blood glucose levels on average around 90      Review of Systems   Constitutional, HEENT, cardiovascular, pulmonary, gi and gu systems are negative, except as otherwise noted.      Objective           Vitals:  No vitals were obtained today due to virtual visit.    Physical Exam   GENERAL: Healthy, alert and no distress  EYES: Eyes grossly normal to inspection.  No discharge or erythema, or obvious scleral/conjunctival abnormalities.  RESP: No audible wheeze, cough, or visible cyanosis.  No visible retractions or increased work of breathing.    SKIN: Visible skin clear. No significant rash, abnormal pigmentation or lesions.  NEURO: Cranial nerves grossly intact.  Mentation and speech appropriate for age.  PSYCH: Mentation appears normal, " affect normal/bright, judgement and insight intact, normal speech and appearance well-groomed.                Video-Visit Details    Type of service:  Video Visit     Originating Location (pt. Location): Home    Distant Location (provider location):  On-site  Platform used for Video Visit: [place]

## 2023-10-22 ENCOUNTER — HEALTH MAINTENANCE LETTER (OUTPATIENT)
Age: 23
End: 2023-10-22

## 2023-11-22 DIAGNOSIS — E11.9 TYPE 2 DIABETES MELLITUS WITHOUT COMPLICATION, WITHOUT LONG-TERM CURRENT USE OF INSULIN (H): ICD-10-CM

## 2023-11-24 RX ORDER — METFORMIN HCL 500 MG
1000 TABLET, EXTENDED RELEASE 24 HR ORAL EVERY MORNING
Qty: 180 TABLET | Refills: 0 | Status: SHIPPED | OUTPATIENT
Start: 2023-11-24 | End: 2024-02-14

## 2023-11-28 ENCOUNTER — TELEPHONE (OUTPATIENT)
Dept: INTERNAL MEDICINE | Facility: CLINIC | Age: 23
End: 2023-11-28
Payer: COMMERCIAL

## 2023-11-28 NOTE — TELEPHONE ENCOUNTER
Patient Quality Outreach    Patient is due for the following:   Diabetes -  Eye Exam and Foot Exam      Topic Date Due    Pneumococcal Vaccine (1 - PCV) Never done    Flu Vaccine (1) 09/01/2023    COVID-19 Vaccine (3 - 2023-24 season) 09/01/2023       Next Steps:   Patient has upcoming appointment, these items will be addressed at that time.    Type of outreach:    none      Questions for provider review:    None           Amanda Meneses MA  Chart routed to none.

## 2024-02-14 ENCOUNTER — TELEPHONE (OUTPATIENT)
Dept: INTERNAL MEDICINE | Facility: CLINIC | Age: 24
End: 2024-02-14
Payer: COMMERCIAL

## 2024-02-14 DIAGNOSIS — E11.9 TYPE 2 DIABETES MELLITUS WITHOUT COMPLICATION, WITHOUT LONG-TERM CURRENT USE OF INSULIN (H): ICD-10-CM

## 2024-02-14 RX ORDER — METFORMIN HCL 500 MG
500 TABLET, EXTENDED RELEASE 24 HR ORAL EVERY MORNING
Qty: 90 TABLET | Refills: 0 | Status: SHIPPED | OUTPATIENT
Start: 2024-02-14 | End: 2024-05-07

## 2024-02-14 NOTE — TELEPHONE ENCOUNTER
Per Tyjoun he is taking 1 tab daily of metformin, per review of most recent visit in September, taking 1 tab daily.  Please review pended order and send to enable pharmacy to have accurate prescription, thanks!    Maude Kaiser, PharmD    [facility]  Phone:  [phone]  Fax:  [phone]

## 2024-03-10 ENCOUNTER — HEALTH MAINTENANCE LETTER (OUTPATIENT)
Age: 24
End: 2024-03-10

## 2024-05-05 ENCOUNTER — TELEPHONE (OUTPATIENT)
Dept: INTERNAL MEDICINE | Facility: CLINIC | Age: 24
End: 2024-05-05
Payer: COMMERCIAL

## 2024-05-05 DIAGNOSIS — E11.9 TYPE 2 DIABETES MELLITUS WITHOUT COMPLICATION, WITHOUT LONG-TERM CURRENT USE OF INSULIN (H): ICD-10-CM

## 2024-05-07 RX ORDER — METFORMIN HCL 500 MG
500 TABLET, EXTENDED RELEASE 24 HR ORAL EVERY MORNING
Qty: 90 TABLET | Refills: 0 | Status: SHIPPED | OUTPATIENT
Start: 2024-05-07

## 2024-05-07 NOTE — TELEPHONE ENCOUNTER
Needs to complete A1c and BMP lab work for future refills. lab work has not been completed.  Rx completed for 90 days.

## 2024-05-07 NOTE — TELEPHONE ENCOUNTER
Left voicemail for patient to call clinic back. Please relay provider's message.     Nohemy CORRALES

## 2024-05-19 ENCOUNTER — HEALTH MAINTENANCE LETTER (OUTPATIENT)
Age: 24
End: 2024-05-19

## 2024-07-28 ENCOUNTER — HEALTH MAINTENANCE LETTER (OUTPATIENT)
Age: 24
End: 2024-07-28

## 2024-08-01 ENCOUNTER — PATIENT OUTREACH (OUTPATIENT)
Dept: CARE COORDINATION | Facility: CLINIC | Age: 24
End: 2024-08-01
Payer: COMMERCIAL

## 2024-08-01 NOTE — PROGRESS NOTES
Clinic Care Coordination Contact  Program:   [facility]: [place]     Renewal:[facility]   Date Applied:      RADHA Outreach:   8/1/24: CTA called to see if patient needed assistance with their [employer] Renewal. Patient declined needing assistance and no follow up needed   CTA WILL APPLICATION TO [facility]   [facility]  Clinic Care Coordination  [phone]      Health Insurance:        Referral/Screening:

## 2024-10-29 DIAGNOSIS — E11.69 TYPE 2 DIABETES MELLITUS WITH OTHER SPECIFIED COMPLICATION, WITHOUT LONG-TERM CURRENT USE OF INSULIN (H): ICD-10-CM

## 2024-12-14 ENCOUNTER — HEALTH MAINTENANCE LETTER (OUTPATIENT)
Age: 24
End: 2024-12-14

## 2025-03-16 ENCOUNTER — HEALTH MAINTENANCE LETTER (OUTPATIENT)
Age: 25
End: 2025-03-16

## 2025-06-08 ENCOUNTER — HEALTH MAINTENANCE LETTER (OUTPATIENT)
Age: 25
End: 2025-06-08

## 2025-06-29 ENCOUNTER — HEALTH MAINTENANCE LETTER (OUTPATIENT)
Age: 25
End: 2025-06-29